# Patient Record
Sex: FEMALE | Race: OTHER | Employment: OTHER | URBAN - METROPOLITAN AREA
[De-identification: names, ages, dates, MRNs, and addresses within clinical notes are randomized per-mention and may not be internally consistent; named-entity substitution may affect disease eponyms.]

---

## 2017-03-29 ENCOUNTER — HOSPITAL ENCOUNTER (OUTPATIENT)
Dept: GENERAL RADIOLOGY | Age: 77
Discharge: HOME | End: 2017-03-29
Attending: FAMILY MEDICINE
Payer: MEDICARE

## 2017-03-29 DIAGNOSIS — K21.9 ESOPHAGEAL REFLUX: ICD-10-CM

## 2017-03-29 PROCEDURE — 74245 XR UGI/BA SWALLOW W SM BOWEL: CPT

## 2017-03-29 PROCEDURE — 74247 XR UPPER GI W KUB AIR CONT: CPT

## 2017-03-29 PROCEDURE — 74011000255 HC RX REV CODE- 255: Performed by: FAMILY MEDICINE

## 2017-03-29 PROCEDURE — 74011000250 HC RX REV CODE- 250: Performed by: FAMILY MEDICINE

## 2017-03-29 RX ADMIN — BARIUM SULFATE 700 MG: 700 TABLET ORAL at 10:16

## 2017-03-29 RX ADMIN — BARIUM SULFATE 100 ML: 0.6 SUSPENSION ORAL at 10:17

## 2017-03-29 RX ADMIN — BARIUM SULFATE 135 ML: 980 POWDER, FOR SUSPENSION ORAL at 10:17

## 2017-03-29 RX ADMIN — ANTACID/ANTIFLATULENT 4 G: 380; 550; 10; 10 GRANULE, EFFERVESCENT ORAL at 10:18

## 2017-09-26 PROBLEM — F02.80 LATE ONSET ALZHEIMER'S DISEASE WITHOUT BEHAVIORAL DISTURBANCE (HCC): Status: ACTIVE | Noted: 2017-06-23

## 2017-09-26 PROBLEM — G30.1 LATE ONSET ALZHEIMER'S DISEASE WITHOUT BEHAVIORAL DISTURBANCE (HCC): Status: ACTIVE | Noted: 2017-06-23

## 2018-05-10 ENCOUNTER — HOME HEALTH ADMISSION (OUTPATIENT)
Dept: HOME HEALTH SERVICES | Facility: HOME HEALTH | Age: 78
End: 2018-05-10

## 2018-06-20 ENCOUNTER — TELEPHONE (OUTPATIENT)
Dept: NUTRITION | Age: 78
End: 2018-06-20

## 2018-10-08 ENCOUNTER — DOCUMENTATION ONLY (OUTPATIENT)
Dept: NUTRITION | Age: 78
End: 2018-10-08

## 2018-10-08 NOTE — PROGRESS NOTES
Nutrition Counseling:  Pt referred by Dr. Dejuan Colbert. Per scheduling:    \" Patient called with results of the insurance check of coverage for a nutrition consult based on her insurance type. Patient would have to be self pay and when presented with a [address] [place] [address] Dr discounted price for the 1 hour session with the dietitian patient responds she is not interested in scheduling. Referral closed. \"    Verónica Vivas [address] 87, 66 [address], [address], ISAAC  W: [phone]  C: [phone]